# Patient Record
Sex: FEMALE | Race: BLACK OR AFRICAN AMERICAN | NOT HISPANIC OR LATINO | URBAN - METROPOLITAN AREA
[De-identification: names, ages, dates, MRNs, and addresses within clinical notes are randomized per-mention and may not be internally consistent; named-entity substitution may affect disease eponyms.]

---

## 2017-01-22 ENCOUNTER — EMERGENCY (EMERGENCY)
Facility: HOSPITAL | Age: 27
LOS: 1 days | Discharge: HOME | End: 2017-01-22
Admitting: EMERGENCY MEDICINE
Payer: COMMERCIAL

## 2017-01-22 VITALS
TEMPERATURE: 98 F | SYSTOLIC BLOOD PRESSURE: 111 MMHG | DIASTOLIC BLOOD PRESSURE: 71 MMHG | HEART RATE: 92 BPM | RESPIRATION RATE: 16 BRPM | OXYGEN SATURATION: 97 %

## 2017-01-22 PROCEDURE — 99283 EMERGENCY DEPT VISIT LOW MDM: CPT

## 2017-01-22 NOTE — ED PROVIDER NOTE - PLAN OF CARE
Follow up with your PMD this week.  Erythromycin ointment to eyes 3 times a day.  Rest, increase fluids. Take tylenol 650mg every 6 hours for pain or temp greater than 99.9. Worsening or continued fever, chills, weakness, nausea, vomiting, abdominal pain return to ER

## 2017-01-22 NOTE — ED PROVIDER NOTE - OBJECTIVE STATEMENT
25 y/o F pt with no significant PMHx presents to the ED for b/l eye redness, pruritis, pain described as burning, lacrimal drainage since yesterday and crusting yesterday and today. Also endorses nausea. 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 States subjective fever, throat pain, and cough 1 week ago. States using new eyeliner. Denies chemical exposure. Denies vomiting. On Adderall. 25 y/o F pt with PMHx ADHD, migraines, seasonal allergies presents to the ED for b/l eye redness, pruritis, pain described as burning, lacrimal drainage since yesterday and crusting yesterday and today. Also endorses nausea. 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 States subjective fever, throat pain, and cough 1 week ago. States using new eyeliner. Denies chemical exposure. Denies vomiting. On Adderall.

## 2017-01-22 NOTE — ED ADULT TRIAGE NOTE - CHIEF COMPLAINT QUOTE
Patient c/o redness to b/l eyes, fever last week with sore throat , productive cough with clear/yellow phlegm. Denies PMH/PSH.

## 2017-01-22 NOTE — ED PROVIDER NOTE - PROGRESS NOTE DETAILS
The scribe's documentation has been prepared under my direction and personally reviewed by me in its entirety. I confirm that the note above accurately reflects all work, treatment, procedures, and medical decision making performed by me.  Abdifatah Gonzales PA-C

## 2017-01-22 NOTE — ED PROVIDER NOTE - CARE PLAN
Principal Discharge DX:	Viral conjunctivitis of both eyes  Instructions for follow-up, activity and diet:	Follow up with your PMD this week.  Erythromycin ointment to eyes 3 times a day.  Rest, increase fluids. Take tylenol 650mg every 6 hours for pain or temp greater than 99.9. Worsening or continued fever, chills, weakness, nausea, vomiting, abdominal pain return to ER  Secondary Diagnosis:	Viral syndrome

## 2017-01-22 NOTE — ED PROVIDER NOTE - PMH
in first trimester  10  Attention deficit disorder  dx 2007  Herniated disc  lumbar  Migraine headache    Seasonal allergies

## 2017-01-22 NOTE — ED PROVIDER NOTE - NS ED MD SCRIBE ATTENDING SCRIBE SECTIONS
HISTORY OF PRESENT ILLNESS/PHYSICAL EXAM/DISPOSITION/PAST MEDICAL/SURGICAL/SOCIAL HISTORY/REVIEW OF SYSTEMS/VITAL SIGNS( Pullset)/HIV

## 2019-01-15 NOTE — ED PROVIDER NOTE - NS ED ATTENDING STATEMENT MOD
no STEs or STDs I have reviewed and agree with all pertinent clinical information, including history and physical exam and agree with treatment plan of the PA.

## 2019-04-19 ENCOUNTER — OUTPATIENT (OUTPATIENT)
Dept: OUTPATIENT SERVICES | Facility: HOSPITAL | Age: 29
LOS: 1 days | End: 2019-04-19

## 2019-04-19 VITALS
HEART RATE: 90 BPM | OXYGEN SATURATION: 98 % | SYSTOLIC BLOOD PRESSURE: 110 MMHG | HEIGHT: 63 IN | TEMPERATURE: 98 F | DIASTOLIC BLOOD PRESSURE: 70 MMHG | WEIGHT: 184.97 LBS | RESPIRATION RATE: 16 BRPM

## 2019-04-19 DIAGNOSIS — Z33.2 ENCOUNTER FOR ELECTIVE TERMINATION OF PREGNANCY: ICD-10-CM

## 2019-04-19 LAB
ANION GAP SERPL CALC-SCNC: 13 MMO/L — SIGNIFICANT CHANGE UP (ref 7–14)
BLD GP AB SCN SERPL QL: NEGATIVE — SIGNIFICANT CHANGE UP
BUN SERPL-MCNC: 11 MG/DL — SIGNIFICANT CHANGE UP (ref 7–23)
CALCIUM SERPL-MCNC: 9.4 MG/DL — SIGNIFICANT CHANGE UP (ref 8.4–10.5)
CHLORIDE SERPL-SCNC: 101 MMOL/L — SIGNIFICANT CHANGE UP (ref 98–107)
CO2 SERPL-SCNC: 23 MMOL/L — SIGNIFICANT CHANGE UP (ref 22–31)
CREAT SERPL-MCNC: 0.54 MG/DL — SIGNIFICANT CHANGE UP (ref 0.5–1.3)
GLUCOSE SERPL-MCNC: 91 MG/DL — SIGNIFICANT CHANGE UP (ref 70–99)
HCT VFR BLD CALC: 36.3 % — SIGNIFICANT CHANGE UP (ref 34.5–45)
HGB BLD-MCNC: 11.4 G/DL — LOW (ref 11.5–15.5)
MCHC RBC-ENTMCNC: 28.7 PG — SIGNIFICANT CHANGE UP (ref 27–34)
MCHC RBC-ENTMCNC: 31.4 % — LOW (ref 32–36)
MCV RBC AUTO: 91.4 FL — SIGNIFICANT CHANGE UP (ref 80–100)
NRBC # FLD: 0 K/UL — SIGNIFICANT CHANGE UP (ref 0–0)
PLATELET # BLD AUTO: 330 K/UL — SIGNIFICANT CHANGE UP (ref 150–400)
PMV BLD: 9.2 FL — SIGNIFICANT CHANGE UP (ref 7–13)
POTASSIUM SERPL-MCNC: 3.8 MMOL/L — SIGNIFICANT CHANGE UP (ref 3.5–5.3)
POTASSIUM SERPL-SCNC: 3.8 MMOL/L — SIGNIFICANT CHANGE UP (ref 3.5–5.3)
RBC # BLD: 3.97 M/UL — SIGNIFICANT CHANGE UP (ref 3.8–5.2)
RBC # FLD: 12.9 % — SIGNIFICANT CHANGE UP (ref 10.3–14.5)
RH IG SCN BLD-IMP: POSITIVE — SIGNIFICANT CHANGE UP
SODIUM SERPL-SCNC: 137 MMOL/L — SIGNIFICANT CHANGE UP (ref 135–145)
WBC # BLD: 8.16 K/UL — SIGNIFICANT CHANGE UP (ref 3.8–10.5)
WBC # FLD AUTO: 8.16 K/UL — SIGNIFICANT CHANGE UP (ref 3.8–10.5)

## 2019-04-19 RX ORDER — DEXTROAMPHETAMINE SACCHARATE, AMPHETAMINE ASPARTATE, DEXTROAMPHETAMINE SULFATE AND AMPHETAMINE SULFATE 1.875; 1.875; 1.875; 1.875 MG/1; MG/1; MG/1; MG/1
0 TABLET ORAL
Qty: 0 | Refills: 0 | COMMUNITY

## 2019-04-19 NOTE — H&P PST ADULT - NEGATIVE ENMT SYMPTOMS
no sinus symptoms/no vertigo/no nasal congestion/no nasal obstruction/no ear pain/no hearing difficulty/no recurrent cold sores/no throat pain/no dysphagia/no abnormal taste sensation/no gum bleeding/no dry mouth/no post-nasal discharge/no nose bleeds/no tinnitus/no nasal discharge

## 2019-04-19 NOTE — H&P PST ADULT - NSICDXPROBLEM_GEN_ALL_CORE_FT
PROBLEM DIAGNOSES  Problem: Encounter for elective termination of pregnancy  Assessment and Plan:   pt evaluated for a scheduled DVC on 4/30/19.  preop instructions provided including NPO status, Pepcid. stop NSAIDs, prenatals on 4/23/19. C/W Adderall as ordered. Verbalized understanding.

## 2019-04-19 NOTE — H&P PST ADULT - NEGATIVE GASTROINTESTINAL SYMPTOMS
no abdominal pain/no hematochezia/no hiccoughs/no nausea/no jaundice/no steatorrhea/no change in bowel habits/no vomiting/no diarrhea/no flatulence

## 2019-04-19 NOTE — H&P PST ADULT - NSICDXPASTMEDICALHX_GEN_ALL_CORE_FT
PAST MEDICAL HISTORY:   in first trimester 10    Attention deficit disorder dx 2007    Herniated disc lumbar    Migraine headache     Seasonal allergies

## 2019-04-19 NOTE — H&P PST ADULT - HISTORY OF PRESENT ILLNESS
27 y/o female w/ PMH of ADHD.  Plan B done 3/7/19, then felt nauseous, pregnancy test done and + on 4/9//19. Pt is 7.5 wks pregnant. noted spotting since march. Pt now for elective DVC. 27 y/o female w/ PMH of ADHD. Presents to PST w/ a preop dx of encounter for elective termination of pregnancy and to be evaluated for a scheduled DVC on 4/30/19. Pt states  did a Plan B on 3/7/19, then felt nauseous, pregnancy test done and + on 4/9//19. Pt is 7.5 wks pregnant. Noted spotting since march. Pt presents now for elective DVC.

## 2019-04-19 NOTE — H&P PST ADULT - NEGATIVE GENERAL GENITOURINARY SYMPTOMS
no incontinence/no flank pain R/no urine discoloration/no flank pain L/no hematuria/no renal colic/no dysuria/no urinary hesitancy/no bladder infections/no nocturia

## 2019-04-19 NOTE — H&P PST ADULT - GIT GEN HX ROS MEA POS PC
constipation/hematochezia/fiber on diet and stool softener. states occasional BRBPR, states possible hemorrhoid

## 2019-04-19 NOTE — H&P PST ADULT - NEGATIVE PSYCHIATRIC SYMPTOMS
no memory loss/no mood swings/no agitation/no auditory hallucinations/no hyperactivity/no suicidal ideation/no depression/no insomnia/no paranoia/no visual hallucinations

## 2019-04-19 NOTE — H&P PST ADULT - ASSESSMENT
DX: DX: encounter for elective termination of pregnancy and evaluated for a scheduled DVC on 4/30/19.

## 2019-04-29 ENCOUNTER — TRANSCRIPTION ENCOUNTER (OUTPATIENT)
Age: 29
End: 2019-04-29

## 2019-04-30 ENCOUNTER — RESULT REVIEW (OUTPATIENT)
Age: 29
End: 2019-04-30

## 2019-04-30 ENCOUNTER — OUTPATIENT (OUTPATIENT)
Dept: OUTPATIENT SERVICES | Facility: HOSPITAL | Age: 29
LOS: 1 days | Discharge: HOME | End: 2019-04-30
Payer: COMMERCIAL

## 2019-04-30 VITALS
RESPIRATION RATE: 12 BRPM | TEMPERATURE: 98 F | DIASTOLIC BLOOD PRESSURE: 61 MMHG | HEART RATE: 78 BPM | OXYGEN SATURATION: 100 % | SYSTOLIC BLOOD PRESSURE: 100 MMHG

## 2019-04-30 VITALS
OXYGEN SATURATION: 100 % | DIASTOLIC BLOOD PRESSURE: 71 MMHG | RESPIRATION RATE: 18 BRPM | TEMPERATURE: 98 F | WEIGHT: 184.97 LBS | HEIGHT: 63 IN | SYSTOLIC BLOOD PRESSURE: 118 MMHG | HEART RATE: 90 BPM

## 2019-04-30 DIAGNOSIS — Z33.2 ENCOUNTER FOR ELECTIVE TERMINATION OF PREGNANCY: ICD-10-CM

## 2019-04-30 LAB
BLD GP AB SCN SERPL QL: NEGATIVE — SIGNIFICANT CHANGE UP
RH IG SCN BLD-IMP: POSITIVE — SIGNIFICANT CHANGE UP

## 2019-04-30 PROCEDURE — 88304 TISSUE EXAM BY PATHOLOGIST: CPT | Mod: 26

## 2019-04-30 NOTE — ASU DISCHARGE PLAN (ADULT/PEDIATRIC) - CALL YOUR DOCTOR IF YOU HAVE ANY OF THE FOLLOWING:
Nausea and vomiting that does not stop/Unable to urinate/Fever greater than (need to indicate Fahrenheit or Celsius)/Pain not relieved by Medications/Bleeding that does not stop

## 2019-05-03 LAB — SURGICAL PATHOLOGY STUDY: SIGNIFICANT CHANGE UP

## 2019-05-20 ENCOUNTER — TRANSCRIPTION ENCOUNTER (OUTPATIENT)
Age: 29
End: 2019-05-20

## 2020-11-21 ENCOUNTER — TRANSCRIPTION ENCOUNTER (OUTPATIENT)
Age: 30
End: 2020-11-21

## 2021-03-26 ENCOUNTER — OUTPATIENT (OUTPATIENT)
Dept: OUTPATIENT SERVICES | Facility: HOSPITAL | Age: 31
LOS: 1 days | End: 2021-03-26

## 2021-03-26 VITALS
RESPIRATION RATE: 18 BRPM | SYSTOLIC BLOOD PRESSURE: 118 MMHG | TEMPERATURE: 98 F | HEIGHT: 63.25 IN | DIASTOLIC BLOOD PRESSURE: 80 MMHG | HEART RATE: 98 BPM | WEIGHT: 207.9 LBS | OXYGEN SATURATION: 99 %

## 2021-03-26 DIAGNOSIS — N85.6 INTRAUTERINE SYNECHIAE: ICD-10-CM

## 2021-03-26 DIAGNOSIS — Z33.2 ENCOUNTER FOR ELECTIVE TERMINATION OF PREGNANCY: Chronic | ICD-10-CM

## 2021-03-26 DIAGNOSIS — Z91.013 ALLERGY TO SEAFOOD: ICD-10-CM

## 2021-03-26 LAB
HCG UR QL: NEGATIVE — SIGNIFICANT CHANGE UP
HCT VFR BLD CALC: 36.8 % — SIGNIFICANT CHANGE UP (ref 34.5–45)
HGB BLD-MCNC: 12 G/DL — SIGNIFICANT CHANGE UP (ref 11.5–15.5)
MCHC RBC-ENTMCNC: 28.2 PG — SIGNIFICANT CHANGE UP (ref 27–34)
MCHC RBC-ENTMCNC: 32.6 GM/DL — SIGNIFICANT CHANGE UP (ref 32–36)
MCV RBC AUTO: 86.4 FL — SIGNIFICANT CHANGE UP (ref 80–100)
NRBC # BLD: 0 /100 WBCS — SIGNIFICANT CHANGE UP
NRBC # FLD: 0 K/UL — SIGNIFICANT CHANGE UP
PLATELET # BLD AUTO: 372 K/UL — SIGNIFICANT CHANGE UP (ref 150–400)
RBC # BLD: 4.26 M/UL — SIGNIFICANT CHANGE UP (ref 3.8–5.2)
RBC # FLD: 13.1 % — SIGNIFICANT CHANGE UP (ref 10.3–14.5)
WBC # BLD: 8.44 K/UL — SIGNIFICANT CHANGE UP (ref 3.8–10.5)
WBC # FLD AUTO: 8.44 K/UL — SIGNIFICANT CHANGE UP (ref 3.8–10.5)

## 2021-03-26 RX ORDER — DEXTROAMPHETAMINE SACCHARATE, AMPHETAMINE ASPARTATE, DEXTROAMPHETAMINE SULFATE AND AMPHETAMINE SULFATE 1.875; 1.875; 1.875; 1.875 MG/1; MG/1; MG/1; MG/1
1 TABLET ORAL
Qty: 0 | Refills: 0 | DISCHARGE

## 2021-03-26 RX ORDER — ALUMINUM HYDROXIDE AND MAGNESIUM TRISILICATE 80; 14.2 MG/1; MG/1
2 TABLET, CHEWABLE ORAL
Qty: 0 | Refills: 0 | DISCHARGE

## 2021-03-26 RX ORDER — SODIUM CHLORIDE 9 MG/ML
3 INJECTION INTRAMUSCULAR; INTRAVENOUS; SUBCUTANEOUS ONCE
Refills: 0 | Status: DISCONTINUED | OUTPATIENT
Start: 2021-04-07 | End: 2021-04-21

## 2021-03-26 RX ORDER — SODIUM CHLORIDE 9 MG/ML
1000 INJECTION, SOLUTION INTRAVENOUS
Refills: 0 | Status: DISCONTINUED | OUTPATIENT
Start: 2021-04-07 | End: 2021-04-21

## 2021-03-26 RX ORDER — PREGABALIN 225 MG/1
1 CAPSULE ORAL
Qty: 0 | Refills: 0 | DISCHARGE

## 2021-03-26 NOTE — H&P PST ADULT - NEGATIVE ENMT SYMPTOMS
no hearing difficulty/no ear pain/no tinnitus/no vertigo/no sinus symptoms/no nasal congestion/no nasal discharge/no nasal obstruction/no nose bleeds/no recurrent cold sores/no abnormal taste sensation/no gum bleeding/no dry mouth/no throat pain/no dysphagia

## 2021-03-26 NOTE — H&P PST ADULT - ATTENDING COMMENTS
I have consented the patient for the proposed procedure including the pelvic examination under anesthesia by myself and any learners in the room.

## 2021-03-26 NOTE — H&P PST ADULT - NSICDXPROBLEM_GEN_ALL_CORE_FT
PROBLEM DIAGNOSES  Problem: Intrauterine synechiae  Assessment and Plan: Scheduled for hysteroscopy with lysis of adhesions, endometrial sampling with Myosure on 04/07/2021. Pre op instructions, famotidine given and explained. Pt verbalized understanding.  Pt instructed to bring urine specimen on day of surgery, urine cup given to pt who verbalized understanding.  Pt instructed to contact surgeon's office regarding appt for Covid test pre op. Pt verbalized understanding.      Problem: Allergy to lobster  Assessment and Plan: OR booking notified via fax

## 2021-03-26 NOTE — H&P PST ADULT - NEGATIVE GENERAL GENITOURINARY SYMPTOMS
no hematuria/no renal colic/no flank pain L/no flank pain R/no urine discoloration/no gas in urine/no bladder infections/no incontinence/no dysuria/no urinary hesitancy/no nocturia

## 2021-03-26 NOTE — H&P PST ADULT - NSICDXPASTMEDICALHX_GEN_ALL_CORE_FT
PAST MEDICAL HISTORY:   in first trimester 10    Attention deficit disorder dx 2007    Herniated disc lumbar    Migraine headache     Obesity     Seasonal allergies

## 2021-03-26 NOTE — H&P PST ADULT - NEGATIVE OPHTHALMOLOGIC SYMPTOMS
no diplopia/no photophobia/no lacrimation L/no lacrimation R/no blurred vision R/no discharge L/no discharge R/no pain L/no pain R/no irritation L/no irritation R/no loss of vision L

## 2021-03-26 NOTE — H&P PST ADULT - NEGATIVE NEUROLOGICAL SYMPTOMS
no weakness/no paresthesias/no syncope/no tremors/no vertigo/no loss of sensation/no difficulty walking/no loss of consciousness

## 2021-03-26 NOTE — H&P PST ADULT - HISTORY OF PRESENT ILLNESS
31 y/o female w/ PMH of ADHD, obesity, migraines     . Presents to PST w/ a preop dx of encounter for elective termination of pregnancy and to be evaluated for a scheduled DVC on 4/30/19. Pt states  did a Plan B on 3/7/19, then felt nauseous, pregnancy test done and + on 4/9//19. Pt is 7.5 wks pregnant. Noted spotting since march. Pt presents now for elective DVC.     Miscarriage on 10/2020, trying to conceive , eval by fertility specialist, hysterogram showed scat tissue  29 y/o female w/ PMH of ADHD, obesity, migraines presents to PST for pre op evaluation stated that she had a "miscarriage on 10/2020, trying to conceive. Pt was evaluated by fertility specialist, hysterogram showed "scar tissue". Pre op diagnosis: intrauterine synechiae. Now scheduled for hysteroscopy with lysis of adhesions, endometrial sampling with Myosure on 04/07/2021

## 2021-03-26 NOTE — H&P PST ADULT - NSICDXPASTSURGICALHX_GEN_ALL_CORE_FT
PAST SURGICAL HISTORY:  History of D&C legal  2012     PAST SURGICAL HISTORY:  History of D&C legal  2012    Termination of pregnancy (fetus) 2019

## 2021-03-26 NOTE — H&P PST ADULT - NS SC CAGE ALCOHOL CUT DOWN
Edna IUD received in [place] [facility].   Routing to , please call patient to schedule appointment for insertion.   no

## 2021-03-26 NOTE — H&P PST ADULT - NEGATIVE GASTROINTESTINAL SYMPTOMS
no nausea/no vomiting/no diarrhea/no change in bowel habits/no abdominal pain/no melena/no hematochezia/no steatorrhea/no jaundice/no hiccoughs

## 2021-03-26 NOTE — H&P PST ADULT - NEGATIVE PSYCHIATRIC SYMPTOMS
no suicidal ideation/no depression/no insomnia/no memory loss/no paranoia/no mood swings/no agitation/no visual hallucinations/no auditory hallucinations/no hyperactivity

## 2021-04-03 DIAGNOSIS — Z01.818 ENCOUNTER FOR OTHER PREPROCEDURAL EXAMINATION: ICD-10-CM

## 2021-04-04 ENCOUNTER — APPOINTMENT (OUTPATIENT)
Dept: DISASTER EMERGENCY | Facility: CLINIC | Age: 31
End: 2021-04-04

## 2021-04-06 ENCOUNTER — TRANSCRIPTION ENCOUNTER (OUTPATIENT)
Age: 31
End: 2021-04-06

## 2021-04-06 NOTE — ASU PATIENT PROFILE, ADULT - PMH
in first trimester  10  Attention deficit disorder  dx 2007  Herniated disc  lumbar  Migraine headache    Obesity    Seasonal allergies

## 2021-04-06 NOTE — ASU PATIENT PROFILE, ADULT - MEDICATIONS BROUGHT TO HOSPITAL, PROFILE
Island Pedicle Flap Text: The defect edges were debeveled with a #15 scalpel blade.  Given the location of the defect, shape of the defect and the proximity to free margins an island pedicle advancement flap was deemed most appropriate.  Using a sterile surgical marker, an appropriate advancement flap was drawn incorporating the defect, outlining the appropriate donor tissue and placing the expected incisions within the relaxed skin tension lines where possible.    The area thus outlined was incised deep to adipose tissue with a #15 scalpel blade.  The skin margins were undermined to an appropriate distance in all directions around the primary defect and laterally outward around the island pedicle utilizing iris scissors.  There was minimal undermining beneath the pedicle flap. no

## 2021-04-07 ENCOUNTER — RESULT REVIEW (OUTPATIENT)
Age: 31
End: 2021-04-07

## 2021-04-07 ENCOUNTER — OUTPATIENT (OUTPATIENT)
Dept: OUTPATIENT SERVICES | Facility: HOSPITAL | Age: 31
LOS: 1 days | Discharge: HOME | End: 2021-04-07
Payer: COMMERCIAL

## 2021-04-07 VITALS
OXYGEN SATURATION: 99 % | SYSTOLIC BLOOD PRESSURE: 111 MMHG | RESPIRATION RATE: 15 BRPM | HEART RATE: 78 BPM | DIASTOLIC BLOOD PRESSURE: 78 MMHG

## 2021-04-07 VITALS
TEMPERATURE: 98 F | HEART RATE: 85 BPM | SYSTOLIC BLOOD PRESSURE: 121 MMHG | RESPIRATION RATE: 16 BRPM | DIASTOLIC BLOOD PRESSURE: 73 MMHG | WEIGHT: 207.9 LBS | HEIGHT: 63.25 IN | OXYGEN SATURATION: 100 %

## 2021-04-07 DIAGNOSIS — Z33.2 ENCOUNTER FOR ELECTIVE TERMINATION OF PREGNANCY: Chronic | ICD-10-CM

## 2021-04-07 DIAGNOSIS — N85.6 INTRAUTERINE SYNECHIAE: ICD-10-CM

## 2021-04-07 LAB — HCG UR QL: NEGATIVE — SIGNIFICANT CHANGE UP

## 2021-04-07 PROCEDURE — 88305 TISSUE EXAM BY PATHOLOGIST: CPT | Mod: 26

## 2021-04-07 RX ORDER — DEXTROAMPHETAMINE SACCHARATE, AMPHETAMINE ASPARTATE, DEXTROAMPHETAMINE SULFATE AND AMPHETAMINE SULFATE 1.875; 1.875; 1.875; 1.875 MG/1; MG/1; MG/1; MG/1
1 TABLET ORAL
Qty: 0 | Refills: 0 | DISCHARGE

## 2021-04-07 RX ORDER — CETIRIZINE HYDROCHLORIDE 10 MG/1
1 TABLET ORAL
Qty: 0 | Refills: 0 | DISCHARGE

## 2021-04-07 RX ORDER — SODIUM CHLORIDE 9 MG/ML
1000 INJECTION, SOLUTION INTRAVENOUS
Refills: 0 | Status: DISCONTINUED | OUTPATIENT
Start: 2021-04-07 | End: 2021-04-21

## 2021-04-07 NOTE — ASU DISCHARGE PLAN (ADULT/PEDIATRIC) - CARE PROVIDER_API CALL
Lissy Ram)  Obstetrics and Gynecology  [address], [address]  [place] [address]  Phone: [phone]  Fax: [phone]  Established Patient  Follow Up Time: 2 weeks

## 2021-04-07 NOTE — ASU DISCHARGE PLAN (ADULT/PEDIATRIC) - NURSING INSTRUCTIONS
You were given intravenous TYLENOL for pain management at 11:30 AM. Please DO NOT take any products containing TYLENOL or ACETAMINOPHEN, such as VICODIN, PERCOCET, EXCEDRIN, and any over-the-counter cold medication for the next 6 hours until 5:30 PM. DO NOT TAKE MORE THAN 3000 MG OF TYLENOL in a 24 hour period.     You were given intravenous TORADOL for pain management at 11:45 AM. Please DO NOT take any products containing IBUPROFEN, MOTRIN, OR ADVIL until 4:45 PM.

## 2021-04-07 NOTE — BRIEF OPERATIVE NOTE - OPERATION/FINDINGS
EUA with small mobile uterus and normal appearing cervix. Cavity appears inactive overall with small detached synechia noted from anterior to posterior uterus in the lower uterine segment.

## 2021-04-10 LAB — SARS-COV-2 N GENE NPH QL NAA+PROBE: NOT DETECTED

## 2021-04-13 LAB — SURGICAL PATHOLOGY STUDY: SIGNIFICANT CHANGE UP

## 2021-07-23 ENCOUNTER — TRANSCRIPTION ENCOUNTER (OUTPATIENT)
Age: 31
End: 2021-07-23

## 2021-08-11 NOTE — H&P PST ADULT - LYMPH NODES
I have reviewed discharge instructions with the patient. The patient verbalized understanding. Patient left ED via Discharge Method: ambulatory to Home with  . Opportunity for questions and clarification provided. Patient given 0 scripts. Pt in no acute distress at discharge    To continue your aftercare when you leave the hospital, you may receive an automated call from our care team to check in on how you are doing. This is a free service and part of our promise to provide the best care and service to meet your aftercare needs.  If you have questions, or wish to unsubscribe from this service please call [phone]. Thank you for Choosing our [facility] Emergency Department. No lymphadedenopathy

## 2023-12-31 NOTE — ASU PATIENT PROFILE, ADULT - NS PRO TALK SOMEONE YN
Called by RN as patient c/o cough and pain at site of BP cuff. Will give tessalon pearls x 1 dose and lidocaine patch for arm. RN to call w/ any changes/concerns. Called by RN as patient c/o cough and pain at site of BP cuff. Will give tessalon pearls x 1 dose and lidocaine patch for arm. RN to call w/ any changes/concerns.    Addendum: Per further chart review, patient with elevated troponin ~1000. Repeat cardiac enzyme set ordered STAT. EKG repeat ordered STAT. Pt seen and examined at bedside. Patient c/o SOB, palpitations Called by RN as patient c/o cough and pain at site of BP cuff. Will give tessalon pearls x 1 dose and lidocaine patch for arm. RN to call w/ any changes/concerns.    Addendum: Per further chart review, patient with elevated troponin ~1000. Pt seen and examined at bedside. Patient c/o SOB present upon coming to hospital per patient, palpitations x 1 month. He denies chest pain but is c/o L sided low rib cage pain that does radiate upwards into chest (unable to show me location) and into bilateral arms per patient. Concern for ACS given patient's symptoms, though may also be multifactorial secondary to influenza virus+ and cough exacerbating rib/chest discomfort. Exam notable for patient appearing mildly uncomfortable, groaning in pain. Heart regular rate, rhythm. Lungs decreased breath sounds throughout, no acc muscle use - patient reports pain when asked to take deep breaths,+ coughing (productive) in encounter. Abd soft, tenderness in L lower ribcage. No calf ttp. Repeat cardiac enzyme set ordered STAT. EKG repeat ordered STAT. EKG without ST elevations in contiguous leads. Called by RN as patient c/o cough and pain at site of BP cuff. Will give tessalon pearls x 1 dose and lidocaine patch for arm. RN to call w/ any changes/concerns.    Addendum: Per further chart review, patient with elevated troponin ~1000. Pt seen and examined at bedside. Patient c/o SOB present upon coming to hospital per patient, palpitations x 1 month. He denies chest pain but is c/o L sided low rib cage pain that does radiate upwards into chest (unable to show me location) and into bilateral arms per patient. Concern for ACS given patient's symptoms, though may also be multifactorial secondary to influenza virus+ and cough exacerbating rib/chest discomfort. Exam notable for patient appearing mildly uncomfortable, groaning in pain. Heart regular rate, rhythm. Lungs decreased breath sounds throughout, no acc muscle use - patient reports pain when asked to take deep breaths,+ coughing (productive) in encounter. Abd soft, tenderness in L lower ribcage. No calf ttp. Repeat cardiac enzyme set ordered STAT. EKG repeat ordered STAT. EKG without ST elevations in contiguous leads. D/w Dr ROMERO Garcia - will cont eliquis as ordered for now. Further management per primary team. Called by RN as patient c/o cough and pain at site of BP cuff. Will give tessalon pearls x 1 dose and lidocaine patch for arm. RN to call w/ any changes/concerns.    Addendum: Per further chart review, patient with elevated troponin ~1000. Pt seen and examined at bedside. Patient c/o SOB present upon coming to hospital per patient, palpitations x 1 month. He denies chest pain but is c/o L sided low rib cage pain that does radiate upwards into chest (unable to show me location) and into bilateral arms per patient. Concern for ACS given patient's symptoms, though may also be multifactorial secondary to influenza virus+ and cough exacerbating rib/chest discomfort. Exam notable for patient appearing mildly uncomfortable, groaning in pain. Heart regular rate, rhythm. Lungs decreased breath sounds throughout, no acc muscle use - patient reports pain when asked to take deep breaths,+ coughing (productive) in encounter. Abd soft, tenderness in L lower ribcage. No calf ttp. Repeat cardiac enzyme set ordered STAT. EKG repeat ordered STAT. EKG without ST elevations in contiguous leads. D/w Dr ROMERO Garcia - will cont eliquis as ordered for now. Further management per primary team.    Addendum: Patient's troponin, CK elevations noted. D/W ANTHONY Garcia. Further management per primary team. no